# Patient Record
Sex: MALE | Race: BLACK OR AFRICAN AMERICAN | NOT HISPANIC OR LATINO | URBAN - METROPOLITAN AREA
[De-identification: names, ages, dates, MRNs, and addresses within clinical notes are randomized per-mention and may not be internally consistent; named-entity substitution may affect disease eponyms.]

---
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
## 2020-02-19 NOTE — PROGRESS NOTE PEDS - PROBLEM SELECTOR PLAN 1
Lantus 11 units subQ daily. Please give lantus 2 hours prior to discontinuing the insulin drip.   Check d-sticks q3-4 hours.   Please using sliding scale to administer Humalog. Do not administer Humalog sooner than 3 hours.   </= 120 – 0 units  121 – 200 – 0.5 units   201 – 280 – 1 unit  281 – 360 – 2 units  361 -- 440 – 3 units   >/= 441 – 4 units  - will follow Continue lantus 11 units subQ daily.   Check d-sticks q3-4 hours.   Please using sliding scale to administer Humalog. Do not administer Humalog sooner than 3 hours.   </= 120 – 0 units  121 – 200 – 0.5 units   201 – 280 – 1 unit  281 – 360 – 2 units  361 -- 440 – 3 units   >/= 441 – 4 units  - will follow - Continue lantus 11 units subQ daily.   - Check d-sticks q3-4 hours.   - Please using sliding scale to administer Humalog. Do not administer Humalog sooner than 3 hours.   </= 120 – 0 units  121 – 200 – 0.5 units   201 – 280 – 1 unit  281 – 360 – 2 units  361 -- 440 – 3 units   >/= 441 – 4 units  - will follow no chest pain and no edema.

## 2020-09-15 NOTE — PROGRESS NOTE PEDS - ASSESSMENT
Patient notified.     17 year old male with severe global developmental delay, seizure disorder, hydrocephalus/VPS, spastic quadriplegia; admitted with HHS, shock and acute respiratory failure, progressing to MODS with ARDS, CAROLA (with fluid overload and metabolic acidosis) and hepatic dysfunction. 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  With DVT s/p IVC filter (11/18/2018) staying off anticoagulation due to GI hemorrhage.   With GI bleeding not improving on maximal medical therapy.        PLAN:  Airway clearance: Pulmonary toilet nebs  Place scopolamine patch  Continue current vent support  Cont Octreotide infusion  Cont to keep NPO on TPN. Cont H2 blocker and PPI  Still with IVC filter; contraindication for lovenox given GI bleeding  Antibiotic lock of PICC  Continue with dressing changes QOD  Being seen by PT/OT  Palliative care team following    Mother feels ready to withdraw support, including the ventilator, but other family members are not ready (the father and older siblings).  Will continue to talk to family and provide support to the mother. 17 year old male with severe global developmental delay, seizure disorder, hydrocephalus/VPS, spastic quadriplegia; admitted with HHS, shock and acute respiratory failure, progressing to MODS with ARDS, CAROLA (with fluid overload and metabolic acidosis) and hepatic dysfunction. 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  With DVT s/p IVC filter (11/18/2018) staying off anticoagulation due to GI hemorrhage.   With GI bleeding not improving on maximal medical therapy.        PLAN:  Airway clearance: Pulmonary toilet nebs  Continue current vent support  Cont Octreotide infusion awaiting decision from family  Cont to keep NPO on TPN. Cont H2 blocker and PPI  Still with IVC filter; contraindication for lovenox given GI bleeding  Antibiotic lock of PICC  Continue with dressing changes QOD  Being seen by PT/OT  Palliative care team following    Mother feels ready to withdraw support, including the ventilator, but other family members are not ready (the father and older siblings).  Will continue to talk to family and provide support to the mother. 17 year old male with severe global developmental delay, seizure disorder, hydrocephalus/VPS, spastic quadriplegia; admitted with HHS, shock and acute respiratory failure, progressing to MODS with ARDS, CAROLA (with fluid overload and metabolic acidosis) and hepatic dysfunction. 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  With DVT s/p IVC filter (11/18/2018) staying off anticoagulation due to GI hemorrhage.   With GI bleeding not improving on maximal medical therapy, although hemoglobin stable over the last 2 days.          PLAN:  Airway clearance: Pulmonary toilet nebs  Continue current vent support  Cont Octreotide infusion awaiting decision from family (see below)  Cont to keep NPO on TPN. Cont H2 blocker and PPI  Still with IVC filter; contraindication for lovenox given GI bleeding  Antibiotic lock of PICC  Continue with dressing changes QOD  Being seen by PT/OT  Palliative care team following    Mother feels ready to withdraw support, including the ventilator, but other family members are not ready (the father and older siblings).  Will continue to talk to family and provide support to the mother.

## 2020-09-16 NOTE — PROGRESS NOTE PEDS - ASSESSMENT
PT consult complete and documented 17y old male w left leg in non reducible contraction and forefoot wet gangrene now s/p  lle knee disarticulation amputation for sepsis control, remains oliguric with marked leukocytosis. GOC discussion documented on 11/8 family would like to proceed forward with all measures but now patient DNR    - Discussed completion AKA with Dr. Baker , patient needs nutritional optimization  , proceeding with operative intervention at this time would have high risk for wound dehiscence and death for elective surgery  - c/w wet to dry dressing changes  daily by vascular surgery  - will continue to follow  - Plan discussed with Dr. Olivia Chiu PGY-2  C Team [id number]

## 2021-01-01 NOTE — PROGRESS NOTE PEDS - SUBJECTIVE AND OBJECTIVE BOX
NICU Speech Therapy Treatment Note    Current Status:   Infant now 37w5d  Bed Type: open crib  Respiratory Support: .25L NC 21%  Alarms: none recent  Feeding: NG, PO, Bottle and Breast   Pump Time: 30 minutes   Schedule: ad scott with minimums   Medications: diuril per orders and ferinsol   Recent Diagnostics: Eye exam today 9/3 - \"Stable stage 2 zone 2 w/o plus both eyes, recheck 10 d mon\"  RN completed pain assessment during care time    CLINICAL RE-ASSESSMENT:  Oral Feeding Readiness:    Physiological stability: RR < 60, Oxygen support: liter flow < 2.0 liters and Pump feedings tolerated < 45 minutes   State Stability: Awake/ alert   Interest or \"drive\" to suck: Own hands to mouth and Roots to pacifier/nipple    Clinical Assessment of Swallowing:    Oral phase:     Latching on: rooting with organized latch on nipple    Sucking: burst/pause pattern     Pharyngeal phase: multiple swallows     Physiological stability: fatigue,, tachypenea, and oxygen desturations,    State regulation: fatigue   Interest or \"drive\" to suck:  Own hands to mouth and Roots to pacifier/nipple    Recommended Interventions:   position: elevated sidelying  nipple type: Dr. Brown Ultra Preemie  pacing  rest breaks: frequent,, deep breaths, and to maximize endurance  re-alerting: Yes  limit feeding time to: 30 minutes    Summary:   Infant now at 37w 5d continues to demonstrate the following key issues during nipple feeding: limited stamina and increased work of breathing.     Plan/ Recommendations:  Speech Therapy to continue to follow 4 times per week.     Education: Parents were not present. Will meet and provide teaching strategies as available.     Short term goals:Nipple feed with increased stamina/ endurance taking appropriate volume for weight gain consistently across feedings for at least 24 hours  Maintain organized and efficient suck during nipple feeding to support intake of necessary volume for weight gain across all feedings for at  OVERNIGHT EVENTS:  Vital Signs Last 24 Hrs  T(C): 37.6 (11 Nov 2018 08:00), Max: 38.2 (11 Nov 2018 05:00)  T(F): 99.6 (11 Nov 2018 08:00), Max: 100.7 (11 Nov 2018 05:00)  HR: 126 (11 Nov 2018 08:00) (96 - 140)  BP: 112/68 (11 Nov 2018 08:00) (101/55 - 116/79)  BP(mean): 77 (11 Nov 2018 08:00) (65 - 87)  RR: 66 (11 Nov 2018 08:00) (32 - 70)  SpO2: 99% (11 Nov 2018 08:00) (96% - 100%)      LABS:                        8.6    20.07 )-----------( 289      ( 11 Nov 2018 06:00 )             25.4     11-11    153<H>  |  115<H>  |  49<H>  ----------------------------<  124<H>  3.0<L>   |  21<L>  |  1.73<H>    Ca    8.5      11 Nov 2018 06:00  Phos  5.7     11-11  Mg     1.7     11-11    TPro  6.7  /  Alb  2.1<L>  /  TBili  < 0.2<L>  /  DBili  x   /  AST  12  /  ALT  < 4<L>  /  AlkPhos  155  11-11        CAPILLARY BLOOD GLUCOSE          CULTURES:    Culture - Respiratory:   NRF^Normal Respiratory Barbara  QUANTITY OF GROWTH: RARE (11-06 @ 14:00)  Culture - Respiratory:   NRF^Normal Respiratory Barbara  QUANTITY OF GROWTH: RARE (11-05 @ 16:57)      MEDICATIONS:  Antibiotics:  ceFAZolin  IV Intermittent - Peds 910 milliGRAM(s) IV Intermittent every 8 hours    Neuro:  acetaminophen   Oral Liquid - Peds. 320 milliGRAM(s) Oral every 6 hours PRN  acetaminophen   Oral Liquid - Peds. 320 milliGRAM(s) Oral every 6 hours PRN  LORazepam IV Intermittent - Peds 2 milliGRAM(s) IV Intermittent once  PHENobarbital IV Intermittent - Peds 30 milliGRAM(s) IV Intermittent every 12 hours    Anticoagulation  heparin Lock (1,000 Units/mL) - Peds 1000 Unit(s) Catheter daily  heparin Lock (1,000 Units/mL) - Peds 1200 Unit(s) Catheter daily    OTHER:  ALBUTerol  Intermittent Nebulization - Peds 2.5 milliGRAM(s) Nebulizer every 8 hours  chlorhexidine 0.12% Oral Liquid - Peds 15 milliLiter(s) Swish and Spit two times a day  cloNIDine 0.1 mG/24Hr(s) Transdermal Patch - Peds 1 Patch Transdermal every 7 days  cloNIDine 0.2 mG/24Hr(s) Transdermal Patch - Peds 1 Patch Transdermal every 7 days  epoetin stephanie Injection - Peds 1000 Unit(s) SubCutaneous <User Schedule>  famotidine IV Intermittent - Peds 13.6 milliGRAM(s) IV Intermittent every 12 hours  pantoprazole  IV Intermittent - Peds 20 milliGRAM(s) IV Intermittent every 12 hours  polyvinyl alcohol 1.4%/povidone 0.6% Ophthalmic Solution - Peds 1 Drop(s) Both EYES every 8 hours  sodium chloride 3% for Nebulization - Peds 3 milliLiter(s) Nebulizer three times a day    IVF:  dextrose 5% + sodium chloride 0.225%. - Pediatric 1000 milliLiter(s) IV Continuous <Continuous>  sodium chloride 0.9% lock flush - Peds 10 milliLiter(s) IV Push every 12 hours        DVT PROPHYLAXIS:  [] Venodynes                                [] Heparin/Lovenox    RADIOLOGY & ADDITIONAL TESTS: least a 72 hour period.    Discharge goals: Nipple feed without any adverse overt events  Nipple feed with increased stamina necessary for adequate intake for appropriate growth/weight gain for at least a 48 hour period  Parent(s) will demonstrate appropriate use of strategies/ techniques to support nipple feeding (including positioning, bottle set up and use, pacing per cues)
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
## 2022-02-22 NOTE — PROGRESS NOTE PEDS - SUBJECTIVE AND OBJECTIVE BOX
Interval/Overnight Events:    VITAL SIGNS:  T(C): 37.1 (12-22-18 @ 08:00), Max: 37.7 (12-22-18 @ 05:00)  HR: 112 (12-22-18 @ 10:00) (92 - 128)  BP: 91/60 (12-22-18 @ 08:00) (91/60 - 115/69)  ABP: --  ABP(mean): --  RR: 23 (12-22-18 @ 08:00) (23 - 34)  SpO2: 98% (12-22-18 @ 10:00) (95% - 100%)  CVP(mm Hg): --    ==================================RESPIRATORY===================================  [ ] FiO2: ___ 	[ ] Heliox: ____ 		[ ] BiPAP: ___   [ ] NC: __  Liters			[ ] HFNC: __ 	Liters, FiO2: __  [ ] End-Tidal CO2:  [ ] Mechanical Ventilation: Mode: SIMV with PS, RR (machine): 8, TV (machine): 240, FiO2: 21, PEEP: 6, PS: 10, ITime: 0.8, MAP: 10, PIP: 16  [ ] Inhaled Nitric Oxide:    Respiratory Medications:  ALBUTerol  Intermittent Nebulization - Peds 2.5 milliGRAM(s) Nebulizer <User Schedule>  ALBUTerol  Intermittent Nebulization - Peds 2.5 milliGRAM(s) Nebulizer every 6 hours PRN  fluticasone  propionate  44 MICROgram(s) HFA Inhaler - Peds 2 Puff(s) Inhalation two times a day    [ ] Extubation Readiness Assessed  Comments:    ================================CARDIOVASCULAR================================  [ ] NIRS:  Cardiovascular Medications:  cloNIDine 0.3 mG/24Hr(s) Transdermal Patch - Peds 1 Patch Transdermal every 7 days      Cardiac Rhythm:	[ ] NSR		[ ] Other:  Comments:    ===========================HEMATOLOGIC/ONCOLOGIC=============================    Transfusions:	[ ] PRBC	[ ] Platelets	[ ] FFP		[ ] Cryoprecipitate    Hematologic/Oncologic Medications:    [ ] DVT Prophylaxis:  Comments:    ===============================INFECTIOUS DISEASE===============================  Antimicrobials/Immunologic Medications:  vancomycin 2 mG/mL - heparin  Lock 100 Units/mL - Peds 0.5 milliLiter(s) Catheter daily  vancomycin 2 mG/mL - heparin  Lock 100 Units/mL - Peds 0.5 milliLiter(s) Catheter daily    RECENT CULTURES:        =========================FLUIDS/ELECTROLYTES/NUTRITION==========================  I&O's Summary    21 Dec 2018 07:01  -  22 Dec 2018 07:00  --------------------------------------------------------  IN: 1690 mL / OUT: 1089 mL / NET: 601 mL    22 Dec 2018 07:01  -  22 Dec 2018 10:16  --------------------------------------------------------  IN: 120 mL / OUT: 0 mL / NET: 120 mL      Daily   12-22    138  |  100  |  25<H>  ----------------------------<  397<H>  4.6   |  26  |  0.30<L>    Ca    10.1      22 Dec 2018 05:30  Phos  4.5     12-22  Mg     1.6     12-22    TPro  8.4<H>  /  Alb  3.5  /  TBili  0.3  /  DBili  x   /  AST  184<H>  /  ALT  337<H>  /  AlkPhos  847<H>  12-22      Diet:	[ ] Regular	[ ] Soft		[ ] Clears	[ ] NPO  .	[ ] Other:  .	[ ] NGT		[ ] NDT		[ ] GT		[ ] GJT    Gastrointestinal Medications:  lansoprazole   Oral  Liquid - Peds 30 milliGRAM(s) Enteral Tube daily  ranitidine  Oral Liquid - Peds 30 milliGRAM(s) Oral two times a day    Comments:    =================================NEUROLOGY====================================  [ ] SBS:		[ ] FILIPE-1:	[ ] BIS:  [ ] Adequacy of sedation and pain control has been assessed and adjusted    Neurologic Medications:  acetaminophen   Oral Tab/Cap - Peds. 325 milliGRAM(s) Oral every 6 hours PRN  PHENobarbital  Oral Liquid - Peds 54 milliGRAM(s) Oral every 12 hours  scopolamine 1.5 mG Transdermal Patch - Peds 1 Patch Transdermal every 72 hours    Comments:    OTHER MEDICATIONS:  Endocrine/Metabolic Medications:  insulin glargine SubCutaneous Injection (LANTUS) - Peds 17 Unit(s) SubCutaneous at bedtime    Genitourinary Medications:    Topical/Other Medications:  chlorhexidine 0.12% Oral Liquid - Peds 15 milliLiter(s) Swish and Spit two times a day  polyvinyl alcohol 1.4%/povidone 0.6% Ophthalmic Solution - Peds 1 Drop(s) Both EYES every 8 hours      ==========================PATIENT CARE ACCESS DEVICES===========================  [ ] Peripheral IV  [ ] Central Venous Line	[ ] R	[ ] L	[ ] IJ	[ ] Fem	[ ] SC			Placed:   [ ] Arterial Line		[ ] R	[ ] L	[ ] PT	[ ] DP	[ ] Fem	[ ] Rad	[ ] Ax	Placed:   [ ] PICC:				[ ] Broviac		[ ] Mediport  [ ] Urinary Catheter, Date Placed:   [ ] Necessity of urinary, arterial, and venous catheters discussed    ================================PHYSICAL EXAM==================================      IMAGING STUDIES:    Parent/Guardian is at the bedside:	[ ] Yes	[ ] No  Patient and Parent/Guardian updated as to the progress/plan of care:	[ ] Yes	[ ] No    [ ] The patient remains in critical and unstable condition, and requires ICU care and monitoring  [ ] The patient is improving but requires continued monitoring and adjustment of therapy Interval/Overnight Events:  No acute events overnight.      VITAL SIGNS:  T(C): 37.1 (12-22-18 @ 08:00), Max: 37.7 (12-22-18 @ 05:00)  HR: 112 (12-22-18 @ 10:00) (92 - 128)  BP: 91/60 (12-22-18 @ 08:00) (91/60 - 115/69)  ABP: --  ABP(mean): --  RR: 23 (12-22-18 @ 08:00) (23 - 34)  SpO2: 98% (12-22-18 @ 10:00) (95% - 100%)  CVP(mm Hg): --    ==================================RESPIRATORY===================================  [ ] FiO2: ___ 	[ ] Heliox: ____ 		[ ] BiPAP: ___   [ ] NC: __  Liters			[ ] HFNC: __ 	Liters, FiO2: __  [x] End-Tidal CO2: mostly 30's  [x] Mechanical Ventilation: Mode: SIMV with PS, RR (machine): 8, TV (machine): 240, FiO2: 21, PEEP: 6, PS: 10, ITime: 0.8, MAP: 10, PIP: 16  [ ] Inhaled Nitric Oxide:    Respiratory Medications:  ALBUTerol  Intermittent Nebulization - Peds 2.5 milliGRAM(s) Nebulizer <User Schedule>  ALBUTerol  Intermittent Nebulization - Peds 2.5 milliGRAM(s) Nebulizer every 6 hours PRN  fluticasone  propionate  44 MICROgram(s) HFA Inhaler - Peds 2 Puff(s) Inhalation two times a day    [ ] Extubation Readiness Assessed  Comments:    ================================CARDIOVASCULAR================================  [ ] NIRS:  Cardiovascular Medications:  cloNIDine 0.3 mG/24Hr(s) Transdermal Patch - Peds 1 Patch Transdermal every 7 days      Cardiac Rhythm:	[x] NSR		[ ] Other:  Comments:    ===========================HEMATOLOGIC/ONCOLOGIC=============================    Transfusions:	[ ] PRBC	[ ] Platelets	[ ] FFP		[ ] Cryoprecipitate    Hematologic/Oncologic Medications:    [ ] DVT Prophylaxis:  Comments:    ===============================INFECTIOUS DISEASE===============================  Antimicrobials/Immunologic Medications:  vancomycin 2 mG/mL - heparin  Lock 100 Units/mL - Peds 0.5 milliLiter(s) Catheter daily  vancomycin 2 mG/mL - heparin  Lock 100 Units/mL - Peds 0.5 milliLiter(s) Catheter daily    RECENT CULTURES:        =========================FLUIDS/ELECTROLYTES/NUTRITION==========================  I&O's Summary    21 Dec 2018 07:01  -  22 Dec 2018 07:00  --------------------------------------------------------  IN: 1690 mL / OUT: 1089 mL / NET: 601 mL    22 Dec 2018 07:01  -  22 Dec 2018 10:16  --------------------------------------------------------  IN: 120 mL / OUT: 0 mL / NET: 120 mL      Daily   12-22    138  |  100  |  25<H>  ----------------------------<  397<H>  4.6   |  26  |  0.30<L>    Ca    10.1      22 Dec 2018 05:30  Phos  4.5     12-22  Mg     1.6     12-22    TPro  8.4<H>  /  Alb  3.5  /  TBili  0.3  /  DBili  x   /  AST  184<H>  /  ALT  337<H>  /  AlkPhos  847<H>  12-22      Diet:	[ ] Regular	[ ] Soft		[ ] Clears	[ ] NPO  .	[ ] Other:  .	[ ] NGT		[ ] NDT		[x] GT - Jevity 1.2 feeds	[ ] GJT    Gastrointestinal Medications:  lansoprazole   Oral  Liquid - Peds 30 milliGRAM(s) Enteral Tube daily  ranitidine  Oral Liquid - Peds 30 milliGRAM(s) Oral two times a day    Comments:    =================================NEUROLOGY====================================  [ ] SBS:		[ ] FILIPE-1:	[ ] BIS:  [ ] Adequacy of sedation and pain control has been assessed and adjusted    Neurologic Medications:  acetaminophen   Oral Tab/Cap - Peds. 325 milliGRAM(s) Oral every 6 hours PRN  PHENobarbital  Oral Liquid - Peds 54 milliGRAM(s) Oral every 12 hours  scopolamine 1.5 mG Transdermal Patch - Peds 1 Patch Transdermal every 72 hours    Comments:    OTHER MEDICATIONS:  Endocrine/Metabolic Medications:  insulin glargine SubCutaneous Injection (LANTUS) - Peds 17 Unit(s) SubCutaneous at bedtime    Genitourinary Medications:    Topical/Other Medications:  chlorhexidine 0.12% Oral Liquid - Peds 15 milliLiter(s) Swish and Spit two times a day  polyvinyl alcohol 1.4%/povidone 0.6% Ophthalmic Solution - Peds 1 Drop(s) Both EYES every 8 hours      ==========================PATIENT CARE ACCESS DEVICES===========================  [ ] Peripheral IV  [ ] Central Venous Line	[ ] R	[ ] L	[ ] IJ	[ ] Fem	[ ] SC			Placed:   [ ] Arterial Line		[ ] R	[ ] L	[ ] PT	[ ] DP	[ ] Fem	[ ] Rad	[ ] Ax	Placed:   [x] PICC:	  Brachial			[ ] Broviac		[ ] Mediport  [ ] Urinary Catheter, Date Placed:   [ ] Necessity of urinary, arterial, and venous catheters discussed    ================================PHYSICAL EXAM==================================      IMAGING STUDIES:  < from: US Duplex Venous Lower Ext Ltd, Left (12.21.18 @ 20:41) >  IMPRESSION:     No evidence of left lower extremity deep venous thrombosis.    < end of copied text >  < from: US Duplex Venous Lower Ext Ltd, Left (12.21.18 @ 20:41) >  IMPRESSION:     No evidence of left lower extremity deep venous thrombosis.    < end of copied text >      Parent/Guardian is at the bedside:	[ ] Yes	[x] No  Patient and Parent/Guardian updated as to the progress/plan of care:	[x] Yes	[ ] No    [x] The patient remains in critical and unstable condition, and requires ICU care and monitoring  [ ] The patient is improving but requires continued monitoring and adjustment of therapy    Critical Care time by attending physician, excluding procedure time = 35 minutes Colchicine Counseling:  Patient counseled regarding adverse effects including but not limited to stomach upset (nausea, vomiting, stomach pain, or diarrhea).  Patient instructed to limit alcohol consumption while taking this medication.  Colchicine may reduce blood counts especially with prolonged use.  The patient understands that monitoring of kidney function and blood counts may be required, especially at baseline. The patient verbalized understanding of the proper use and possible adverse effects of colchicine.  All of the patient's questions and concerns were addressed.

## 2022-02-23 NOTE — PROGRESS NOTE PEDS - ASSESSMENT
17 year old male with severe global developmental delay, seizure disorder, hydrocephalus/VPS, spastic quadriplegia; admitted with HHS, shock and acute respiratory failure, progressing to MODS with ARDS, CAROLA (with fluid overload and metabolic acidosis) and hepatic dysfunction. 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  With DVT previously on anticoagulation now stopped due to IC hemorrhage.  With ICU Acquired Weakness and evidence of severe encephalopathy.  Patient has not been moving with minimal arousal off sedatives/neuromuscular blockers.  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 Recommendations include tracheostomy and chronic vent support rather than an attempt at extubation.  His neuro prognosis is poor given his exam and presence of ischemic and hemorrhagic areas as noted on MRI.  Mother has been having more indepth discussions regarding goals of care with palliative care team.  Current decision is to continue life sustaining measures except for CPR (DNR in place).    Bronch 11/5 and 6 with thick copious L lung secretions    DNR - no chest compressions, will rescind for procedures    Plan:    Resp:  S/P tracheostomy 11/20/18- 6.0 Shiley    Airway clearance: Pulmonary toilet nebs  Will discuss plan for CT with surgery. No pneumo on H2O seal- likely D/C chest tube after colonoscopy per surgery.    CV:  Continue Clonidine for autonomic storming    FEN:  Plan for colonoscopy today for further evaluation of gi bleeding with consulting teams (GI and peds surgery).   Cont Octreotide infusion. Following with surgery and GI.  Cont to keep NPO on TPN. Cont H2 blocker and PPI    Endo:   Insulin drip for goal glucose 120-220    Heme:  No pRBC transfusion required in >48 hours.  Still with IVC filter.  Epo TIW    ID:  Patient is afebrile with negative cultures.  Continue ceftriaxone for necrotizing pneumonia for a total of 10 days. (last day 11/24)    Neuro:  Continue phenobarbital for seizures.  Following with neurosurgery  s/p internalization of VPS  Tylenol AC for pain    General:  DNR renewed (11/18)  New PICC line placed 11/16  Following with vascular for Amputated LLE  Continue with dressing changes QOD, may readdress AKA after nutritional status improved  Being seen by PT/OT  Palliative care consult ongoing  Follow up with mom regarding goals of care Estimated Blood Loss (Cc): minimal

## 2022-04-09 NOTE — PROGRESS NOTE PEDS - PROBLEM SELECTOR PROBLEM 4
Patient Education     Asthma (Adult)   Asthma is a disease where the medium and small air passages in the lung go into spasm and restrict air flow. Inflammation and swelling of the airways cause further blockage. During an acute asthma attack, these factors cause trouble breathing, wheezing, cough, and chest tightness.     An asthma attack can be triggered by many things. Common triggers include infections such as the common cold, bronchitis, and pneumonia. Irritants such as smoke or pollutants in the air, very cold air, emotional upset, and exercise can also trigger an attack. In many adults with asthma, allergies to dust, mold, pollen, and animal dander can cause an asthma attack. Skipping doses of daily asthma medicine can also bring on an asthma attack.   Asthma can be controlled using the correct medicines prescribed by your healthcare provider and staying away from known triggers including allergens and irritants.   Home care  · Take prescribed medicine exactly at the times advised. If you need medicine such as from a handheld inhaler or aerosol breathing machine more than every 4 hours, contact your healthcare provider or get medical care right away. If you are prescribed an antibiotic or prednisone, take all of the medicine as prescribed. Keep taking it even if you are feeling better after a few days.  · Don't smoke. Stay away from the smoke of others.  · Some people with asthma find their symptoms get worse when they take aspirin and non-steroidal or fever-reducing medicines such as ibuprofen and naproxen. Talk with your healthcare provider if you think this may apply to you.    Follow-up care  Follow up with your healthcare provider, or as advised. Always bring all of your current medicines to any appointments with your healthcare provider. Also bring a complete list of medicines, even those not taken for asthma. If you don't already have one, talk with your healthcare provider about making your own  Asthma Action Plan.   A pneumonia (pneumococcal) vaccine and yearly flu shot (every fall) are advised. Ask your provider about this.   When to get medical advice  Call your healthcare provider or get medical care right away if any of these occur:    · More wheezing or shortness of breath  · Need to use your inhalers more often than normal without relief  · Fever of 100.4ºF (38ºC) or higher, or as directed by your provider  · Coughing up lots of dark-colored or bloody sputum (mucus)  · Chest pain with each breath  · If you use a peak flow meter as part of an Asthma Action Plan, and you are still in the yellow zone (50% to 80%) 15 minutes after using inhaler medicine.  Call 911  Call 911 if any of these occur:   · Trouble walking or talking because you are short of breath  · If you use a peak flow meter as part of an Asthma Action Plan, and you are still in the red zone (less than 50%) 15 minutes after using inhaler medicine  · Lips or fingernails turn gray, purple, or blue  · Feeling faint or loss of consciousness  [place] last reviewed this educational content on 10/1/2019  © 1320-2123 [employer]. All rights reserved. This information is not intended as a substitute for professional medical care. Always follow your healthcare professional's instructions.            Stroke with cerebral ischemia

## 2022-05-04 NOTE — PROGRESS NOTE PEDS - PROBLEM SELECTOR PROBLEM 1
Maddy Burow's Advancement Flap Text: The defect edges were debeveled with a #15 scalpel blade.  Given the location of the defect and the proximity to free margins a Burow's advancement flap was deemed most appropriate.  Using a sterile surgical marker, the appropriate advancement flap was drawn incorporating the defect and placing the expected incisions within the relaxed skin tension lines where possible.    The area thus outlined was incised deep to adipose tissue with a #15 scalpel blade.  The skin margins were undermined to an appropriate distance in all directions utilizing iris scissors.

## 2022-06-27 NOTE — PROGRESS NOTE PEDS - PROBLEM SELECTOR PLAN 3
Procedure Date:  2022    Patient: Benji Daniel  : 1947    Sedation: MAC    Outpatient History and Physical Review for Colonoscopy    Indications: Screening Colonoscopy    Family History of Colon Cancer or Colon Polyps: No    Current Facility-Administered Medications   Medication Dose Route Frequency Provider Last Rate Last Admin   â¢ lidocaine-sodium bicarbonate 0.9-8.4% for J-tip administration 0.5-1 mL  0.5-1 mL Subcutaneous PRN Yanelis Medina MD        Or   â¢ lidocaine 1 % injection 5-10 mg  5-10 mg Subcutaneous PRN Yanelis Medina MD       â¢ metoPROLOL tartrate (LOPRESSOR) tablet 25 mg  25 mg Oral Once PRN Yanelis Medina MD       â¢ dextrose (GLUTOSE) 40 % gel 30 g  30 g Oral Once PRN Yanelis Medina MD       â¢ dextrose 50 % injection 25 g  25 g Intravenous PRN Yanelis Medina MD       â¢ insulin regular (human) (HumuLIN R, NovoLIN R) sliding scale injection   Subcutaneous Once PRN Yanelis Medina MD       â¢ sodium chloride 0.9 % flush bag 25 mL  25 mL Intravenous PRN Yanelis Medina MD       â¢ sodium chloride (PF) 0.9 % injection 2 mL  2 mL Intracatheter 2 times per day Yanelis Medina MD       â¢ lactated ringers infusion   Intravenous Continuous Yanelis Medina MD       â¢ sodium chloride 0.9% infusion   Intravenous Continuous Yanelis Medina MD       â¢ dextrose 5 % infusion   Intravenous Continuous PRN Yanelis Medina MD       â¢ sodium chloride (PF) 0.9 % injection 2 mL  2 mL Intracatheter 2 times per day Jadon Molina MD       â¢ sodium chloride 0.9% infusion   Intravenous Continuous Jadon Molina MD       â¢ scopolamine (TRANSDERM_SCOP) 1 MG/3DAYS patch 1 patch  1 patch Transdermal Once Yanelis Medina MD   1 patch at 22 4040       ALLERGIES:  Penicillins            Past Medical History:   Diagnosis Date   â¢ Anesthesia complication     extreme dizziness   â¢ Anxiety    â¢ Blepharitis    â¢ Depression    â¢ Dermatochalasis    â¢ Diabetes mellitus (CMS/HCC)    â¢ GERD (gastroesophageal reflux disease)    â¢ Glaucoma (increased eye pressure)    â¢ Hypertension    â¢ Lower back pain    â¢ Nuclear sclerotic cataract of both eyes    â¢ Ocular hypertension    â¢ Osteoarthritis    â¢ PONV (postoperative nausea and vomiting)     associated with dizziness   â¢ Refractive error    â¢ Sinusitis     recurrent   â¢ Spinal stenosis     L4-5   â¢ Syncope     in past, dizzy, nauseated prior   â¢ Vertigo      Past Surgical History:   Procedure Laterality Date   â¢ Arthroscopy shoulder with rotator cuff repair Right 10/11/2019    Right shoulder scope; Debr; SAD; RTC repair; Regeneten graft   â¢ Bunionectomy Bilateral 2006   â¢ Elbow surgery Right 1982    for tendinitis   â¢ Hysterectomy  1994   â¢ Joint replacement Right 7/20/2015    RIGHT  TOTAL HIP ARTHROPLASTY BY DR. Irvin Jacobsen AT [facility]   â¢ Lumbar epidural injection  1/20/2016, 5/14/16, 9/7/16   â¢ Lumbar spine surgery  11/23/2016    L3 and L4 decompressive laminectomies with partial bilateral medial facetectomies and foraminotomies,L3-4 and L4-5 posterior lateral fusion in situ,L3, L4, and L5 segmental posterior pedicle screw instrumented fusion/[facility] Dr Gurpreet De 11/23/16   â¢ Open access colonoscopy  8/21/09    Colonoscopy, Screen   â¢ Sinus surgery  1975   â¢ Skin lesion excision  05/2016    X2 - benign   â¢ Tubal ligation  1976         PHYSICAL EXAM:  HEENT: Within normal limits  LUNGS: Lungs clear to auscultation. No cold symptoms present. HEART: S1,S2, regular rate and rhythm, no murmer. NEUROLOGICAL: Within normal limits. MENTAL STATUS: Alert, oriented x3, interactive. SKIN: Warm, dry and intact, no lesions or rashes. GENITOURINARY: N\A    The risks of the procedure including the possibility of bleeding, perforation (possibly resulting in laparotomy/colostomy) aspiration, and the risk of a polypectomy were discussed with the patient who accepts these risks.         Misha Shields MD  Gastroenterology  8:47 AM, 6/27/2022 --Care as per primary team

## 2022-09-07 NOTE — PROGRESS NOTE PEDS - PROBLEM SELECTOR PROBLEM 3
Gangrene of foot Sski Pregnancy And Lactation Text: This medication is Pregnancy Category D and isn't considered safe during pregnancy. It is excreted in breast milk.

## 2022-09-08 NOTE — PROGRESS NOTE PEDS - REASON FOR ADMISSION
Health Maintenance Due   Topic Date Due   • Hepatitis A Vaccine (1 of 2 - 2-dose series) Never done   • Varicella Vaccine (2 of 2 - 2-dose childhood series) 06/20/2008   • HPV Vaccine (1 - 2-dose series) Never done   • Meningococcal Vaccine (1 - 2-dose series) Never done   • COVID-19 Vaccine (3 - Booster for Pfizer series) 06/05/2022   • Influenza Vaccine (1) 09/01/2022       Patient is due for topics as listed above but is not proceeding with Immunization(s) COVID-19, Hep A, HPV, Influenza, Meningococcal and Varicella at this time. Parent states she will obtain immunization record from former physician in [place]   AMS, hyperglycemia r/o DKA vs HHS

## 2023-05-10 NOTE — PATIENT PROFILE PEDIATRIC. - NS SW CONSULT REASON PEDS
Refill request from pharmacy for the medication listed below.    Patient was last seen 5/5/23 for a Chronic Disease Follow-Up., with a follow up due in 3 months    Next appt 6/14/23.      Last written as    Disp Refills Start End     TEMazepam (RESTORIL) 15 MG capsule 30 capsule 1 4/12/2023     Sig - Route: Take 1 capsule by mouth nightly as needed for Sleep. -        Protocol  - none       Refill forwarded to provider for approval    Pharmacy: [facility]  Call when complete?  n/a  
none

## 2023-12-05 NOTE — PATIENT PROFILE PEDIATRIC. - URINARY CATHETER
no Chronic bipolar. Patient attends a "day care" 4days/week. Home meds =  risperidone 2mg qd, hydroxyzine 50mg qhs, and Fluoxetine 40mg qd    - c/w home meds  - holding hydroxyzine for now, will give melatonin Chronic bipolar. Patient attends a "day care" 4days/week. Home meds =  risperidone 2mg qd, hydroxyzine 50mg qhs, and Fluoxetine 40mg qd    - c/w home meds

## 2024-04-25 NOTE — PROVIDER CONTACT NOTE (CRITICAL VALUE NOTIFICATION) - NS PROVIDER READ BACK TO LAB
Tahmina Buffy taking stable pt home.     yes

## 2024-05-14 NOTE — PATIENT PROFILE PEDIATRIC. - SCHOOL/DAYCARE, PEDS PROFILE
Otc Regimen: Zeasorb Initiate Treatment: triamcinolone acetonide 0.1 % topical cream: Apply to affected areas of the chest twice daily for up to 2 week intervals as needed. Never use on face Detail Level: Zone Plan: Consider Efudex on nose in the fall home w/ parent

## 2024-08-15 NOTE — PROGRESS NOTE PEDS - PROBLEM SELECTOR PROBLEM 5
Addended by: KASHIF MARTINI on: 8/15/2024 03:12 PM     Modules accepted: Orders     Stroke with cerebral ischemia

## 2024-08-21 NOTE — OCCUPATIONAL THERAPY INITIAL EVALUATION PEDIATRIC - ASR EQUIP NEEDS DISCH PT EVAL
tbd at d/c Detail Level: Detailed X Size Of Lesion In Cm (Optional): 0 Introduction Text (Please End With A Colon): The following procedure was deferred: Procedure To Be Performed At Next Visit: Excision Instructions (Optional): Patient changed mind and wants an excision now

## 2025-04-24 NOTE — PROGRESS NOTE PEDS - REASON FOR ADMISSION
Consent: The patient's consent was obtained including but not limited to risks of crusting, scabbing, blistering, scarring, darker or lighter pigmentary change, recurrence, incomplete removal and infection. Render Post-Care Instructions In Note?: no Number Of Freeze-Thaw Cycles: 1 freeze-thaw cycle Detail Level: Detailed Show Applicator Variable?: Yes Post-Care Instructions: I reviewed with the patient in detail post-care instructions. Patient is to wear sunprotection, and avoid picking at any of the treated lesions. Pt may apply Vaseline to crusted or scabbing areas. AMS, hyperglycemia r/o DKA vs HHS Duration Of Freeze Thaw-Cycle (Seconds): 10
